# Patient Record
Sex: MALE | URBAN - METROPOLITAN AREA
[De-identification: names, ages, dates, MRNs, and addresses within clinical notes are randomized per-mention and may not be internally consistent; named-entity substitution may affect disease eponyms.]

---

## 2017-02-20 ENCOUNTER — HOSPITAL ENCOUNTER (OUTPATIENT)
Dept: GENERAL RADIOLOGY | Facility: HOSPITAL | Age: 52
Discharge: HOME | End: 2017-02-20
Attending: EMERGENCY MEDICINE

## 2017-02-20 ENCOUNTER — OFFICE VISIT (OUTPATIENT)
Dept: OTHER | Facility: HOSPITAL | Age: 52
End: 2017-02-20
Attending: EMERGENCY MEDICINE

## 2017-02-20 DIAGNOSIS — T14.8XXA CRUSHING INJURY: ICD-10-CM

## 2017-02-20 DIAGNOSIS — T14.8XXA CRUSHING INJURY: Primary | ICD-10-CM

## 2017-02-20 PROCEDURE — 73140 X-RAY EXAM OF FINGER(S): CPT

## 2019-11-01 ENCOUNTER — PATIENT OUTREACH (OUTPATIENT)
Dept: FAMILY MEDICINE CLINIC | Facility: CLINIC | Age: 54
End: 2019-11-01

## 2020-03-17 ENCOUNTER — TELEPHONE (OUTPATIENT)
Dept: FAMILY MEDICINE CLINIC | Facility: CLINIC | Age: 55
End: 2020-03-17

## 2020-08-26 ENCOUNTER — TELEPHONE (OUTPATIENT)
Dept: FAMILY MEDICINE CLINIC | Facility: CLINIC | Age: 55
End: 2020-08-26

## 2021-04-26 ENCOUNTER — PATIENT OUTREACH (OUTPATIENT)
Dept: FAMILY MEDICINE CLINIC | Facility: CLINIC | Age: 56
End: 2021-04-26

## 2021-06-03 ENCOUNTER — PATIENT OUTREACH (OUTPATIENT)
Dept: FAMILY MEDICINE CLINIC | Facility: CLINIC | Age: 56
End: 2021-06-03

## 2021-06-11 ENCOUNTER — TELEPHONE (OUTPATIENT)
Dept: FAMILY MEDICINE CLINIC | Facility: CLINIC | Age: 56
End: 2021-06-11

## 2021-06-11 NOTE — TELEPHONE ENCOUNTER
FYI- Pt LVM after hours 6/10/21 & wife calling today. Stating pt would like to keep dr. Micael Schwab as PCP. Let them know the doctor would like to see her pt's at a minimum of once a year. She agreed and will have  call back to schedule appt.

## 2023-01-24 ENCOUNTER — TELEPHONE (OUTPATIENT)
Dept: FAMILY MEDICINE CLINIC | Facility: CLINIC | Age: 58
End: 2023-01-24

## 2023-02-08 ENCOUNTER — PATIENT OUTREACH (OUTPATIENT)
Dept: CASE MANAGEMENT | Age: 58
End: 2023-02-08

## 2023-02-08 NOTE — PROCEDURES
The office order for PCP request is Approved and finalized on February 8, 2023.     Thanks,  [facility] [employer]

## 2024-11-06 ENCOUNTER — TELEPHONE (OUTPATIENT)
Dept: FAMILY MEDICINE CLINIC | Facility: CLINIC | Age: 59
End: 2024-11-06

## (undated) NOTE — LETTER
11/07/24 November 7, 2024      Jerry Aguilar  [address] [place] [address]      Dear Jerry:      Your health insurance company has notified us that you have chosen or been assigned to Dr. Malika Gonzalez as your Primary Care Physician (PCP). We have not had the pleasure of establishing care with you as of yet.    If you have established care with a different provider and feel that Dr. Malika Gonzalez is not the provider that you have chosen then you must contact your insurance company to remove Dr. Malika Gonzalez as the PCP. Please provide your insurance company with the name of the current PCP that you have established care with. This way the insurance company will update their records to have the correct provider name and info on file and eliminate any future outreach calls and/or correspondence letters from our office.     Due to HIPAA and insurance requirements, [facility] employees are unable to contact your insurance company on your behalf to change your PCP. This PCP change must be done by the policy mcallister of the insurance.   Please give our office a call at your earliest convenience, so that we can help you become established with us.  You may call the office at [phone] to speak with the .     Thank you,    The office of Dr. Malika Gonzalez